# Patient Record
Sex: FEMALE | Race: WHITE | URBAN - METROPOLITAN AREA
[De-identification: names, ages, dates, MRNs, and addresses within clinical notes are randomized per-mention and may not be internally consistent; named-entity substitution may affect disease eponyms.]

---

## 2022-11-29 ENCOUNTER — OFFICE VISIT (OUTPATIENT)
Dept: ORTHOPEDIC SURGERY | Age: 62
End: 2022-11-29

## 2022-11-29 DIAGNOSIS — G25.89 SCAPULAR DYSKINESIS: ICD-10-CM

## 2022-11-29 DIAGNOSIS — M25.511 CHRONIC RIGHT SHOULDER PAIN: Primary | ICD-10-CM

## 2022-11-29 DIAGNOSIS — M62.838 TRAPEZIUS MUSCLE SPASM: ICD-10-CM

## 2022-11-29 DIAGNOSIS — G89.29 CHRONIC RIGHT SHOULDER PAIN: Primary | ICD-10-CM

## 2022-11-29 RX ORDER — OMEPRAZOLE 20 MG/1
20 CAPSULE, DELAYED RELEASE ORAL
Qty: 60 CAPSULE | Refills: 0 | Status: SHIPPED | OUTPATIENT
Start: 2022-11-29 | End: 2022-12-29

## 2022-11-29 RX ORDER — MELOXICAM 7.5 MG/1
7.5 TABLET ORAL DAILY
Qty: 30 TABLET | Refills: 0 | Status: SHIPPED | OUTPATIENT
Start: 2022-11-29

## 2022-11-29 NOTE — PROGRESS NOTES
Name: Abril Reed  YOB: 1960  Gender: female  MRN: [id number]  Date of Encounter:  11/29/2022       CHIEF COMPLAINT:     Chief Complaint   Patient presents with    Shoulder Pain     Right        SUBJECTIVE/OBJECTIVE:      HPI:    Patient is a 60 y.o. pleasant female who presents today for a new evaluation of her right shoulder. Date of injury / sym she has had around a month of right shoulder pain. She locates the pain to the top of her shoulder as well as the medial border of her scapula. Pain is generally worse when driving and at night. Pain can be aching and throbbing, but sometimes severe and causes her to sweat. She had an intramuscular steroid injection and took a prednisone steroid pack which did give her some improvement, but not complete relief. She also takes muscle relaxers at night. She denies numbness to the arm or any midline neck pain. She denies any prior injury, surgery. She is right-hand dominant. PAST HISTORY:   Past medical, surgical, family, social history and allergies reviewed by me. Pertinent history: hypothyroidism, osteoporosis, GERD  Tobacco use:  has no history on file for tobacco use. Diabetes: none  CKD: no  Anticoagulation: no      REVIEW OF SYSTEMS:   As noted in HPI. PHYSICAL EXAMINATION:     Gen: Well-developed, no acute distress   HEENT: NC/AT, EOMI   Neck: Trachea midline, normal ROM   CV: Regular rhythm by palpation of distal pulse, normal capillary refill   Pulm: No respiratory distress, no stridor   Psychiatric: Well oriented, normal mood and affect. Skin: No rashes, lesions or ulcers, normal temperature, turgor, and texture on uninvolved extremity.       ORTHO EXAM:    Right Shoulder:     Inspection: No edema or erythema.+  Lateral scapular winging to right scapula   ROM: Active forward flexion 180, abduction 170, Internal rotation T12, External rotation 40  Tenderness: moderate tenderness to trapezius and rhomboid musculature, paraspinal neck musculature. Mild tenderness to latissimus dorsi. Minimal tenderness to rotator cuff footprint. Strength: Abduction 4+/5, External rotation 5/5, Internal rotation 5/5  Provocative tests: Negative Lamb, O'js, Speeds; Positive Empty can. Negative Spurling  Normal capillary refill / 2+ radial pulse   Sensation intact to light touch     strength 5 out of 5 bilaterally. DIAGNOSTIC IMAGING:     X-ray 3 vw RIGHT shoulder Grashey / axillary / scapular Y    Findings: Normal alignment. No fracture. No significant degenerative changes of GH are seen. High riding humeral head. Minimal AC joint arthritis. Impression: Minimal degenerative changes of right shoulder    I independently interpreted XR taken today    ASSESSMENT/PLAN:   1. Chronic right shoulder pain    2. Trapezius muscle spasm    3. Scapular dyskinesis       She does not exhibit significant signs of impingement or rotator cuff pathology. She also does not exhibit midline neck tenderness, radicular symptoms suggestive of significant discopathy or nerve impingement. She has fairly significant tenderness along her medial scapular musculature, as well as some lateral scapular winging. I advised that she start treatment with physical therapy for strengthening scapular musculature and can perform some soft tissue therapy including dry needling. I advised NSAIDs in moderation and have also prescribed her PPI given her history of GERD. She should also use heat to the area as needed. We will reevaluate in around 6 weeks.     4 This is a chronic illness/condition with exacerbation and progression    Orders / medications today:   Orders Placed This Encounter   Procedures    XR SHOULDER RIGHT (MIN 2 VIEWS)     Right shoulder Grashey Outlet & Axillary     Standing Status:   Future     Number of Occurrences:   1     Standing Expiration Date:   11/28/2023    Ambulatory referral to Physical Therapy     Referral Priority:   Routine Referral Type:   Eval and Treat     Referral Reason:   Patient Preference     Number of Visits Requested:   1      Follow up: Return in about 6 weeks (around 1/10/2023). The patient expressed understanding and agreed with the plan. Mitchell Shore MD   [facility]     This document was created using voice recognition software so frequent mistakes are possible. For any concerns about the wording of this document, please contact its creator for further clarification.

## 2022-12-12 RX ORDER — OMEPRAZOLE 20 MG/1
20 CAPSULE, DELAYED RELEASE ORAL
Qty: 28 CAPSULE | Refills: 2 | OUTPATIENT
Start: 2022-12-12 | End: 2023-01-11

## 2023-01-16 RX ORDER — MELOXICAM 7.5 MG/1
TABLET ORAL
Qty: 30 TABLET | Refills: 0 | OUTPATIENT
Start: 2023-01-16

## 2023-05-17 RX ORDER — OMEPRAZOLE 20 MG/1
20 CAPSULE, DELAYED RELEASE ORAL
Qty: 28 CAPSULE | Refills: 2 | OUTPATIENT
Start: 2023-05-17 | End: 2023-06-16